# Patient Record
Sex: MALE | Race: WHITE | NOT HISPANIC OR LATINO | Employment: FULL TIME | URBAN - METROPOLITAN AREA
[De-identification: names, ages, dates, MRNs, and addresses within clinical notes are randomized per-mention and may not be internally consistent; named-entity substitution may affect disease eponyms.]

---
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
## 2021-03-04 ENCOUNTER — APPOINTMENT (OUTPATIENT)
Dept: RADIOLOGY | Facility: IMAGING CENTER | Age: 21
End: 2021-03-04
Attending: PHYSICIAN ASSISTANT
Payer: COMMERCIAL

## 2021-03-04 ENCOUNTER — OFFICE VISIT (OUTPATIENT)
Dept: URGENT CARE | Facility: CLINIC | Age: 21
End: 2021-03-04
Payer: COMMERCIAL

## 2021-03-04 VITALS
HEIGHT: 73 IN | TEMPERATURE: 99.1 F | OXYGEN SATURATION: 98 % | SYSTOLIC BLOOD PRESSURE: 118 MMHG | DIASTOLIC BLOOD PRESSURE: 88 MMHG | HEART RATE: 46 BPM | BODY MASS INDEX: 22.53 KG/M2 | WEIGHT: 170 LBS | RESPIRATION RATE: 16 BRPM

## 2021-03-04 DIAGNOSIS — S96.912A MUSCLE STRAIN OF LEFT FOOT, INITIAL ENCOUNTER: ICD-10-CM

## 2021-03-04 PROCEDURE — 99213 OFFICE O/P EST LOW 20 MIN: CPT | Performed by: PHYSICIAN ASSISTANT

## 2021-03-04 PROCEDURE — 73630 X-RAY EXAM OF FOOT: CPT | Mod: TC,FY,LT | Performed by: PHYSICIAN ASSISTANT

## 2021-03-04 ASSESSMENT — ENCOUNTER SYMPTOMS
FEVER: 0
SENSORY CHANGE: 0
NAUSEA: 0
CHILLS: 0
VOMITING: 0
FOCAL WEAKNESS: 0

## 2021-03-04 NOTE — LETTER
March 4, 2021       Patient: Tahir Damon   YOB: 2000   Date of Visit: 3/4/2021         To Whom It May Concern:    In my medical opinion, I recommend that Tahir Damon should be permitted light duty at work for one week while wearing walking boot. He may return to full duty thereafter.   If you have any questions or concerns, please don't hesitate to call [phone]          Sincerely,          Braden Tierney P.A.-C.  Electronically Signed

## 2021-03-04 NOTE — PROGRESS NOTES
"Subjective:   Tahir Damon  is a 20 y.o. male who presents for Foot Injury (x2 days, left foot, painful )      HPI    Patient comes clinic describing injury to left yesterday.  He states he was jumping on a trampoline at a trampoline park and when he came down landing his foot planted half and half on a firm surface of borders of the trampoline.  He states his foot was forced into inversion type mechanism complaining of pain to outer aspect of left foot.  He denies focal areas of tenderness rather the whole lateral edge of left foot.  He denies past medical history of surgery or significant injury to this area.  He does have past medical history of contralateral right foot strain with diagnosis of partial plantar fascial tear in 2018.  That was treated with a walking boot and follow-up with sports medicine.  Patient states this feels similar to that injury.  Has tried an OTC ankle brace with minimal change in symptoms as well as NSAIDs with slight benefit.  Patient went to work today but was to lift boxes and was directed to the doctor for a checkup.    Review of Systems   Constitutional: Negative for chills and fever.   Gastrointestinal: Negative for nausea and vomiting.   Musculoskeletal:        Left foot pain   Skin: Negative for rash.   Neurological: Negative for sensory change and focal weakness.       Allergies   Allergen Reactions   • Other Food Rash     Nuts allergy and has swelling of face and rash         Objective:   /88   Pulse (!) 46   Temp 37.3 °C (99.1 °F) (Temporal)   Resp 16   Ht 1.854 m (6' 1\")   Wt 77.1 kg (170 lb)   SpO2 98%   BMI 22.43 kg/m²     Physical Exam  Vitals and nursing note reviewed.   Constitutional:       General: He is not in acute distress.     Appearance: He is well-developed. He is not diaphoretic.   HENT:      Head: Normocephalic and atraumatic.      Right Ear: External ear normal.      Left Ear: External ear normal.      Nose: Nose normal.   Eyes:      " General: No scleral icterus.        Right eye: No discharge.         Left eye: No discharge.      Conjunctiva/sclera: Conjunctivae normal.   Pulmonary:      Effort: Pulmonary effort is normal. No respiratory distress.   Musculoskeletal:         General: Normal range of motion.      Cervical back: Neck supple.      Right ankle: Normal.      Right Achilles Tendon: Normal.      Left ankle: Normal.      Left Achilles Tendon: Normal.      Right foot: Normal.      Left foot: Normal range of motion and normal capillary refill. Tenderness ( lateral edge) present. No swelling, deformity, bunion, Charcot foot, foot drop, prominent metatarsal heads, laceration, bony tenderness or crepitus. Normal pulse.   Skin:     General: Skin is warm and dry.      Coloration: Skin is not pale.   Neurological:      Mental Status: He is alert and oriented to person, place, and time.      Coordination: Coordination normal.     dx foot -      3/4/2021 9:53 AM     HISTORY/REASON FOR EXAM:  Pain/Deformity Following Trauma        TECHNIQUE/EXAM DESCRIPTION AND NUMBER OF VIEWS:  3 nonweightbearing views of the LEFT foot.     COMPARISON:  None     FINDINGS:  There is no evidence of fracture or dislocation. Alignment is maintained.  No periosteal new bone formation or osseous erosion is identified.        IMPRESSION:     No evidence of acute fracture or dislocation.             Last Resulted: 03/04/21 10:03 AM        Patient sent with walking boot.    Assessment/Plan:   1. Muscle strain of left foot, initial encounter  - DX-FOOT-COMPLETE 3+ LEFT; Future  - REFERRAL TO SPORTS MEDICINE  Recommend conservative care, rest, ice, elevation, work on gentle ROM exercises, discussed with patient recommendation to rest, elevate, ice, be diligent with OTC NSAIDs and follow-up with sports medicine if symptoms are persistent  Return to clinic with lack of resolution or progression of symptoms.  F/u w/ sports med w/ persistence    I have worn an N95 mask, gloves  and eye protection for the entire encounter with this patient.     Differential diagnosis, natural history, supportive care, and indications for immediate follow-up discussed.

## 2021-04-07 ENCOUNTER — NON-PROVIDER VISIT (OUTPATIENT)
Dept: OCCUPATIONAL MEDICINE | Facility: CLINIC | Age: 21
End: 2021-04-07

## 2021-04-07 DIAGNOSIS — Z02.89 VISIT FOR OCCUPATIONAL HEALTH EXAMINATION: ICD-10-CM

## 2021-04-07 PROCEDURE — 8907 PR URINE COLLECT ONLY: Performed by: PREVENTIVE MEDICINE

## 2021-04-15 ENCOUNTER — NON-PROVIDER VISIT (OUTPATIENT)
Dept: OCCUPATIONAL MEDICINE | Facility: CLINIC | Age: 21
End: 2021-04-15

## 2021-04-15 DIAGNOSIS — Z02.1 PRE-EMPLOYMENT HEALTH SCREENING EXAMINATION: ICD-10-CM

## 2021-04-15 PROCEDURE — 8907 PR URINE COLLECT ONLY: Performed by: PREVENTIVE MEDICINE

## 2021-04-15 NOTE — NON-PROVIDER
Tierra started the drug test. He does not want to stay since he didn't provide enough. He called his supervisor to see if you can come back...

## 2022-08-01 ENCOUNTER — APPOINTMENT (OUTPATIENT)
Dept: RADIOLOGY | Facility: IMAGING CENTER | Age: 22
End: 2022-08-01
Attending: PHYSICIAN ASSISTANT
Payer: COMMERCIAL

## 2022-08-01 ENCOUNTER — OFFICE VISIT (OUTPATIENT)
Dept: URGENT CARE | Facility: CLINIC | Age: 22
End: 2022-08-01
Payer: COMMERCIAL

## 2022-08-01 VITALS
WEIGHT: 165 LBS | DIASTOLIC BLOOD PRESSURE: 84 MMHG | RESPIRATION RATE: 18 BRPM | TEMPERATURE: 98.2 F | OXYGEN SATURATION: 98 % | HEART RATE: 64 BPM | HEIGHT: 74 IN | SYSTOLIC BLOOD PRESSURE: 132 MMHG | BODY MASS INDEX: 21.17 KG/M2

## 2022-08-01 DIAGNOSIS — S60.051A CONTUSION OF RIGHT LITTLE FINGER WITHOUT DAMAGE TO NAIL, INITIAL ENCOUNTER: ICD-10-CM

## 2022-08-01 DIAGNOSIS — M79.641 RIGHT HAND PAIN: ICD-10-CM

## 2022-08-01 PROCEDURE — 99213 OFFICE O/P EST LOW 20 MIN: CPT | Performed by: PHYSICIAN ASSISTANT

## 2022-08-01 PROCEDURE — 73130 X-RAY EXAM OF HAND: CPT | Mod: TC,FY,RT | Performed by: RADIOLOGY

## 2022-08-01 NOTE — PROGRESS NOTES
"  Subjective:   Tahir Damon is a 21 y.o. male who presents today with   Chief Complaint   Patient presents with   • Hand Injury     Right hand, Punch a wall,      Hand Injury  This is a new problem. The current episode started today. The problem occurs constantly. The problem has been unchanged. The symptoms are aggravated by bending. He has tried nothing for the symptoms. The treatment provided no relief.   Patient states it did happen while he was at work today but had nothing to do with work and does not want to file it as work-related injury today.  He states it was emotional control as he got frustrated and punched a wall.    PMH:  has no past medical history of Asthma or Type II or unspecified type diabetes mellitus without mention of complication, not stated as uncontrolled.  MEDS: No current outpatient medications on file.  ALLERGIES:   Allergies   Allergen Reactions   • Other Food Rash     Nuts allergy and has swelling of face and rash      SURGHX: History reviewed. No pertinent surgical history.  SOCHX:  reports that he has never smoked. He has never used smokeless tobacco. He reports current alcohol use. He reports that he does not use drugs.  FH: Reviewed with patient, not pertinent to this visit.     Review of Systems   Musculoskeletal:        Right hand pain      Objective:   /84   Pulse 64   Temp 36.8 °C (98.2 °F) (Temporal)   Resp 18   Ht 1.88 m (6' 2\")   Wt 74.8 kg (165 lb)   SpO2 98%   BMI 21.18 kg/m²   Physical Exam  Vitals and nursing note reviewed.   Constitutional:       General: He is not in acute distress.     Appearance: Normal appearance. He is well-developed. He is not ill-appearing or toxic-appearing.   HENT:      Head: Normocephalic and atraumatic.      Right Ear: Hearing normal.      Left Ear: Hearing normal.   Eyes:      Pupils: Pupils are equal, round, and reactive to light.   Cardiovascular:      Rate and Rhythm: Normal rate and regular rhythm.      Heart sounds: " Normal heart sounds.   Pulmonary:      Effort: Pulmonary effort is normal.   Musculoskeletal:        Hands:       Comments: Tenderness to palpation of the fourth and fifth metacarpal of the right hand.  Swelling to the dorsal aspect of the right hand as well in this area.  Neurovascular intact distally.  Less than 2 capillary fill.  Patient is able to flex and extend the digits of the right hand.  Does have pain with making a full fist.   Skin:     General: Skin is warm and dry.   Neurological:      Mental Status: He is alert.      Coordination: Coordination normal.   Psychiatric:         Mood and Affect: Mood normal.       DX HAND   FINDINGS:  Mild dorsal soft tissue swelling of the distal metacarpals.  No radiopaque foreign body.  No fracture or dislocation.     IMPRESSION:     1.  No fracture or dislocation of RIGHT hand.  2.  Mild dorsal soft tissue swelling.    Assessment/Plan:   Assessment    1. Right hand pain  - DX-HAND 3+ RIGHT; Future    2. Contusion of right little finger without damage to nail, initial encounter  No fracture on x-ray today.  Most consistent with contusion of the right hand.  Splint and Ace wrap given today.  Ice and rest recommended.  Discussed recommendation of repeating x-ray if symptoms continue to persist over the next 7 days.  Again patient elects to not file injury as work-related injury.  Differential diagnosis, natural history, supportive care, and indications for immediate follow-up discussed.   Patient given instructions and understanding of medications and treatment.    If not improving in 3-5 days, F/U with PCP or return to  if symptoms worsen.    Patient agreeable to plan.      Please note that this dictation was created using voice recognition software. I have made every reasonable attempt to correct obvious errors, but I expect that there are errors of grammar and possibly content that I did not discover before finalizing the note.    Manav Jimenez PA-C

## 2023-10-13 ENCOUNTER — NON-PROVIDER VISIT (OUTPATIENT)
Dept: OCCUPATIONAL MEDICINE | Facility: CLINIC | Age: 23
End: 2023-10-13

## 2023-10-13 DIAGNOSIS — Z02.89 VISIT FOR OCCUPATIONAL HEALTH EXAMINATION: ICD-10-CM

## 2023-10-13 PROCEDURE — 8907 PR URINE COLLECT ONLY: Performed by: NURSE PRACTITIONER

## 2024-05-10 ENCOUNTER — APPOINTMENT (OUTPATIENT)
Dept: RADIOLOGY | Facility: IMAGING CENTER | Age: 24
End: 2024-05-10
Attending: NURSE PRACTITIONER
Payer: COMMERCIAL

## 2024-05-10 ENCOUNTER — OCCUPATIONAL MEDICINE (OUTPATIENT)
Dept: URGENT CARE | Facility: CLINIC | Age: 24
End: 2024-05-10
Payer: COMMERCIAL

## 2024-05-10 VITALS
SYSTOLIC BLOOD PRESSURE: 102 MMHG | HEART RATE: 69 BPM | RESPIRATION RATE: 12 BRPM | OXYGEN SATURATION: 99 % | BODY MASS INDEX: 21.46 KG/M2 | HEIGHT: 74 IN | WEIGHT: 167.2 LBS | DIASTOLIC BLOOD PRESSURE: 40 MMHG | TEMPERATURE: 97.8 F

## 2024-05-10 DIAGNOSIS — S69.92XA INJURY OF LEFT LITTLE FINGER, INITIAL ENCOUNTER: ICD-10-CM

## 2024-05-10 DIAGNOSIS — S63.259A DISLOCATION OF FINGER, INITIAL ENCOUNTER: ICD-10-CM

## 2024-05-10 PROCEDURE — 99213 OFFICE O/P EST LOW 20 MIN: CPT | Performed by: NURSE PRACTITIONER

## 2024-05-10 PROCEDURE — 73140 X-RAY EXAM OF FINGER(S): CPT | Mod: TC,LT | Performed by: RADIOLOGY

## 2024-05-10 PROCEDURE — 3078F DIAST BP <80 MM HG: CPT | Performed by: NURSE PRACTITIONER

## 2024-05-10 PROCEDURE — 3074F SYST BP LT 130 MM HG: CPT | Performed by: NURSE PRACTITIONER

## 2024-05-10 ASSESSMENT — ENCOUNTER SYMPTOMS: TINGLING: 1

## 2024-05-11 NOTE — PROGRESS NOTES
"Subjective:   Tahir Damon is a 23 y.o. male who presents for Other (NEW WC DOI: 5/10/24 (L) pinky feels numb, painful )      HPI  DOI: 5/10/24: Patient is a 27-year-old male presents urgent care for evaluation after he was injured at work.  Patient was unloading a trailer when a large box weighing approximately 85 pounds fell injuring his left little finger.  Left little finger was dislocated laterally.  He patient unintentionally reduced dislocation when he was using an ice pack.  After putting finger back in place he did zacarias tape his fingers.  Has pain at the joint.  Does have some numbness feeling decreased range of motion.  Patient is right-hand dominant.  Patient denies previous injury to the left hand.  Patient denies second job  Review of Systems   Musculoskeletal:  Positive for joint pain.   Neurological:  Positive for tingling.       Medications:    IBUPROFEN PO    Allergies: Other food    Problem List: Tahir Damon does not have a problem list on file.    Surgical History:  No past surgical history on file.    Past Social Hx: Tahir Damon  reports that he has never smoked. He has never used smokeless tobacco. He reports current alcohol use. He reports current drug use. Drug: Marijuana.     Past Family Hx:  Tahir Damon family history includes Diabetes in his paternal grandmother.     Problem list, medications, and allergies reviewed by myself today in Epic.     Objective:     /40 (BP Location: Right arm, Patient Position: Sitting, BP Cuff Size: Adult)   Pulse 69   Temp 36.6 °C (97.8 °F) (Temporal)   Resp 12   Ht 1.88 m (6' 2\")   Wt 75.8 kg (167 lb 3.2 oz)   SpO2 99%   BMI 21.47 kg/m²     Physical Exam  Constitutional:       Appearance: Normal appearance. He is not ill-appearing or toxic-appearing.   HENT:      Head: Normocephalic.      Right Ear: External ear normal.      Left Ear: External ear normal.      Nose: Nose normal.      Mouth/Throat:      Lips: Pink.   Eyes: "      General: Lids are normal.   Pulmonary:      Effort: Pulmonary effort is normal. No accessory muscle usage.   Musculoskeletal:      Left hand: Swelling, tenderness and bony tenderness (Fifth PIP) present. Decreased range of motion. Normal strength. There is disruption of two-point discrimination. Normal capillary refill. Normal pulse.      Cervical back: Full passive range of motion without pain.   Neurological:      Mental Status: He is alert and oriented to person, place, and time.   Psychiatric:         Mood and Affect: Mood normal.         Thought Content: Thought content normal.         Assessment/Plan:     Diagnosis and associated orders:     1. Injury of left little finger, initial encounter  DX-FINGER(S) 2+ LEFT      2. Dislocation of finger, initial encounter             Comments/MDM:     I independently reviewed the patient's imaging and agree with the interpretation of the radiologist.    No fracture or dislocation of LEFT 5th digit.     I personally reviewed prior external notes and prior test results pertinent to today's visit.  X-ray negative for any acute findings.  Patient was able to reduce dislocation prior to arrival.  Finger splint was provided.  Encouraged rest, ice, Tyle Motrin as needed for pain.  Work restrictions provided.  Follow-up in 1 week  Discussed management options, risks and benefits, and alternatives to treatment plan agreed upon.   Red flags discussed and indications to immediately call 911 or present to the Emergency Department.   Supportive care, differential diagnoses, and indications for immediate follow-up discussed with patient.    Patient expresses understanding and agrees to plan. Patient denies any other questions or concerns.              Please note that this dictation was created using voice recognition software. I have made a reasonable attempt to correct obvious errors, but I expect that there are errors of grammar and possibly content that I did not discover  before finalizing the note.    This note was electronically signed by Mauro MARTINEZ.

## 2024-05-17 ENCOUNTER — OCCUPATIONAL MEDICINE (OUTPATIENT)
Dept: URGENT CARE | Facility: CLINIC | Age: 24
End: 2024-05-17
Payer: COMMERCIAL

## 2024-05-17 VITALS
DIASTOLIC BLOOD PRESSURE: 62 MMHG | WEIGHT: 167 LBS | TEMPERATURE: 98.2 F | RESPIRATION RATE: 15 BRPM | BODY MASS INDEX: 21.43 KG/M2 | SYSTOLIC BLOOD PRESSURE: 118 MMHG | HEIGHT: 74 IN | OXYGEN SATURATION: 95 % | HEART RATE: 72 BPM

## 2024-05-17 DIAGNOSIS — S63.259D DISLOCATION OF FINGER, SUBSEQUENT ENCOUNTER: ICD-10-CM

## 2024-05-17 PROCEDURE — 3078F DIAST BP <80 MM HG: CPT | Performed by: STUDENT IN AN ORGANIZED HEALTH CARE EDUCATION/TRAINING PROGRAM

## 2024-05-17 PROCEDURE — 99214 OFFICE O/P EST MOD 30 MIN: CPT | Performed by: STUDENT IN AN ORGANIZED HEALTH CARE EDUCATION/TRAINING PROGRAM

## 2024-05-17 PROCEDURE — 3074F SYST BP LT 130 MM HG: CPT | Performed by: STUDENT IN AN ORGANIZED HEALTH CARE EDUCATION/TRAINING PROGRAM

## 2024-05-17 NOTE — PROGRESS NOTES
"Subjective:   Tahir Damon is a 23 y.o. male who presents for Follow-Up (Workers [employer], [employer] [facility] )      HPI:  23-year-old male presents today for work-related injury follow-up.  Still having swelling to the left pinky finger.  Does still have reduced range of motion.  Wearing a splint and tolerating his current work.  Has not had significant improvement since last visit.  No worsening symptoms though.  No numbness, tingling, or burning.  Negative x-rays at last visit.      Medications:    IBUPROFEN PO    Allergies: Other food    Problem List: Tahir Damon does not have a problem list on file.    Surgical History:  No past surgical history on file.    Past Social Hx: Tahir Damon  reports that he has never smoked. He has never used smokeless tobacco. He reports current alcohol use. He reports current drug use. Drug: Marijuana.     Past Family Hx:  Tahir Damon family history includes Diabetes in his paternal grandmother.     Problem list, medications, and allergies reviewed by myself today in Epic.     Objective:     /62   Pulse 72   Temp 36.8 °C (98.2 °F) (Temporal)   Resp 15   Ht 1.88 m (6' 2\")   Wt 75.8 kg (167 lb)   SpO2 95%   BMI 21.44 kg/m²     Physical Exam  Vitals reviewed.   Cardiovascular:      Rate and Rhythm: Normal rate.      Pulses: Normal pulses.   Pulmonary:      Effort: Pulmonary effort is normal.   Musculoskeletal:      Comments: Left fifth finger: Swelling to the PIP joint.  Tenderness to the PIP joint with palpation.  Reduced range of motion with making a full fist although only slightly limited.  Does have range of motion all joints of the finger.  No numbness, tingling, burning.  Cap refill less than 2 seconds.  2+ radial pulse.         Assessment/Plan:     Diagnosis and associated orders:     1. Dislocation of finger, subsequent encounter           Comments/MDM:     Patient's presentation physical exam findings consistent with soft tissue injury versus " ligamentous injury.  Does have some reduced range of motion which could be due to swelling versus ligament pathology.  Stable this time and tolerating current work restrictions.  Continue work restrictions.  Referral to occupational medicine for further evaluation.         Differential diagnosis, natural history, supportive care, and indications for immediate follow-up discussed.    Advised the patient to follow-up with the primary care physician for recheck, reevaluation, and consideration of further management.    Please note that this dictation was created using voice recognition software. I have made a reasonable attempt to correct obvious errors, but I expect that there are errors of grammar and possibly content that I did not discover before finalizing the note.    Electronically signed by Paul Payne PA-C.       Sski Pregnancy And Lactation Text: This medication is Pregnancy Category D and isn't considered safe during pregnancy. It is excreted in breast milk.

## 2024-06-04 ENCOUNTER — OCCUPATIONAL MEDICINE (OUTPATIENT)
Dept: OCCUPATIONAL MEDICINE | Facility: CLINIC | Age: 24
End: 2024-06-04
Payer: COMMERCIAL

## 2024-06-04 VITALS
OXYGEN SATURATION: 98 % | TEMPERATURE: 97.5 F | RESPIRATION RATE: 18 BRPM | DIASTOLIC BLOOD PRESSURE: 78 MMHG | HEIGHT: 74 IN | BODY MASS INDEX: 21.82 KG/M2 | HEART RATE: 63 BPM | SYSTOLIC BLOOD PRESSURE: 105 MMHG | WEIGHT: 170 LBS

## 2024-06-04 DIAGNOSIS — S63.259D DISLOCATION OF FINGER, SUBSEQUENT ENCOUNTER: ICD-10-CM

## 2024-06-04 PROCEDURE — 1125F AMNT PAIN NOTED PAIN PRSNT: CPT | Performed by: NURSE PRACTITIONER

## 2024-06-04 PROCEDURE — 3078F DIAST BP <80 MM HG: CPT | Performed by: NURSE PRACTITIONER

## 2024-06-04 PROCEDURE — 99214 OFFICE O/P EST MOD 30 MIN: CPT | Performed by: NURSE PRACTITIONER

## 2024-06-04 PROCEDURE — 3074F SYST BP LT 130 MM HG: CPT | Performed by: NURSE PRACTITIONER

## 2024-06-04 ASSESSMENT — PAIN SCALES - GENERAL: PAINLEVEL: 2=MINIMAL-SLIGHT

## 2024-06-04 NOTE — PROGRESS NOTES
Subjective:     Tahir Damon is a 23 y.o. male who presents for Follow-Up ( Follow-Up (Work[employer], [employer]) RM 18/ /)  (Copied from initial visit) DOI: 5/10/24: Patient is a 27-year-old male presents urgent care for evaluation after he was injured at work.  Patient was unloading a trailer when a large box weighing approximately 85 pounds fell injuring his left little finger.  Left little finger was dislocated laterally.  He patient unintentionally reduced dislocation when he was using an ice pack.  After putting finger back in place he did zacarias tape his fingers.  Has pain at the joint.  Does have some numbness feeling decreased range of motion.  Patient is right-hand dominant.  Patient denies previous injury to the left hand.  Patient denies second job     Today patient states that symptoms have improved.  He does have some tightness when he goes to bend his finger and some mild swelling otherwise no new or worsening symptoms.  He states that he does not have any numbness, tingling, or weakness in the joint.  He is not taking any OTC medication or using ice.  He is doing range of motion to improve things.  Anticipate continued resolution of symptoms over time.  Patient declined hand therapy at this time.  He would like to be released back to full duty.  Plan of care discussed with patient.         ROS: All systems were reviewed on intake form, form was reviewed and signed. See scanned documents in media. Pertinent positives and negatives included in HPI.    PMH: No pertinent past medical history to this problem  MEDS: Medications were reviewed in Epic  ALLERGIES:   Allergies   Allergen Reactions    Other Food Rash     Nuts allergy and has swelling of face and rash      SOCHX: Works as  at [employer]  FH: No pertinent family history to this problem       Objective:     /78 (BP Location: Left arm, Patient Position: Sitting, BP Cuff Size: Adult)   Pulse 63   Temp 36.4 °C (97.5  "°F)   Resp 18   Ht 1.88 m (6' 2\")   Wt 77.1 kg (170 lb)   SpO2 98%   BMI 21.83 kg/m²     [unfilled]     Left pinky: No gross deformity or discoloration noted.  There is mild soft tissue swelling noted to the fifth PIP.  Strength is intact.  Neurovascular sensation intact.  Minimally decreased range of motion.   strength 5/5.    Assessment/Plan:       1. Dislocation of finger, subsequent encounter      FROM   6/4/24 TO  Discharged/I           Differential diagnosis, natural history, supportive care, and indications for immediate follow-up discussed.    .Work Status: Full Duty - see D-[employer] or other state/federal worker's compensation forms for specific restrictions if applicable  Follow-up if needed    Differential diagnosis, natural history, supportive care, and indications for immediate follow-up discussed.    Approximately 45 minutes were spent in reviewing notes, preparing for visit, obtaining history, exam and evaluation, patient counseling/education, and post visit documentation/orders. Significant time was spent completing state/federal worker's compensation forms.      "

## 2025-04-16 ENCOUNTER — RESULTS FOLLOW-UP (OUTPATIENT)
Dept: URGENT CARE | Facility: CLINIC | Age: 25
End: 2025-04-16

## 2025-04-16 ENCOUNTER — OFFICE VISIT (OUTPATIENT)
Dept: URGENT CARE | Facility: CLINIC | Age: 25
End: 2025-04-16
Payer: COMMERCIAL

## 2025-04-16 VITALS
OXYGEN SATURATION: 97 % | SYSTOLIC BLOOD PRESSURE: 116 MMHG | RESPIRATION RATE: 18 BRPM | WEIGHT: 166.4 LBS | HEART RATE: 88 BPM | HEIGHT: 73 IN | TEMPERATURE: 100.2 F | BODY MASS INDEX: 22.05 KG/M2 | DIASTOLIC BLOOD PRESSURE: 76 MMHG

## 2025-04-16 DIAGNOSIS — J02.9 PHARYNGITIS, UNSPECIFIED ETIOLOGY: ICD-10-CM

## 2025-04-16 DIAGNOSIS — J06.9 VIRAL URI: ICD-10-CM

## 2025-04-16 LAB — S PYO DNA SPEC NAA+PROBE: NOT DETECTED

## 2025-04-16 PROCEDURE — 99213 OFFICE O/P EST LOW 20 MIN: CPT

## 2025-04-16 PROCEDURE — 3074F SYST BP LT 130 MM HG: CPT

## 2025-04-16 PROCEDURE — 3078F DIAST BP <80 MM HG: CPT

## 2025-04-16 PROCEDURE — 87651 STREP A DNA AMP PROBE: CPT

## 2025-04-16 ASSESSMENT — ENCOUNTER SYMPTOMS
CHILLS: 1
MYALGIAS: 1
HEADACHES: 1
COUGH: 1
SORE THROAT: 1
FEVER: 1

## 2025-04-16 NOTE — PROGRESS NOTES
Subjective:     CHIEF COMPLAINT  Chief Complaint   Patient presents with    Congestion     X 4 days, congestion, hot flashes, cough. Requesting a work note.       HPI  Tahir Damon is a very pleasant 24 y.o. male who presents with a fever, a cough, hot and cold flashes, persistent sweats, lack of appetite, nasal congestion, a mild sore throat, and fatigue.  His symptoms have been present for the past 4 days.  A coworker was sick with similar symptoms.  He has tried managing his symptoms with nasal spray, use of a humidifier, Epsom salt baths, and NyQuil.  He is requesting a work note to excuse his absences from work Monday through Wednesday of this week.      REVIEW OF SYSTEMS  Review of Systems   Constitutional:  Positive for chills, fever and malaise/fatigue.   HENT:  Positive for congestion and sore throat (Mild).    Respiratory:  Positive for cough.    Musculoskeletal:  Positive for myalgias.   Neurological:  Positive for headaches.       PAST MEDICAL HISTORY  There are no active problems to display for this patient.      SURGICAL HISTORY  patient denies any surgical history    ALLERGIES  Allergies   Allergen Reactions    Other Food Rash     Nuts allergy and has swelling of face and rash        CURRENT MEDICATIONS  Home Medications       Reviewed by Priya Jones P.A.-C. (Physician Assistant) on 04/16/25 at 1142  Med List Status: <None>     Medication Last Dose Status   IBUPROFEN PO Not Taking Active                    SOCIAL HISTORY  Social History     Tobacco Use    Smoking status: Never    Smokeless tobacco: Never   Vaping Use    Vaping status: Some Days    Substances: Nicotine, THC, CBD, Flavoring    Devices: Disposable, Pre-filled or refillable cartridge   Substance and Sexual Activity    Alcohol use: Not Currently     Comment: occ    Drug use: Not Currently     Types: Marijuana    Sexual activity: Not Currently       FAMILY HISTORY  Family History   Problem Relation Age of Onset    Diabetes  "Paternal Grandmother           Objective:     VITAL SIGNS: /76   Pulse 88   Temp 37.9 °C (100.2 °F) (Oral)   Resp 18   Ht 1.854 m (6' 1\")   Wt 75.5 kg (166 lb 6.4 oz)   SpO2 97%   BMI 21.95 kg/m²     PHYSICAL EXAM  Physical Exam  Vitals reviewed.   Constitutional:       General: He is not in acute distress.     Appearance: Normal appearance. He is ill-appearing. He is not toxic-appearing.   HENT:      Head: Normocephalic and atraumatic.      Right Ear: Tympanic membrane, ear canal and external ear normal.      Left Ear: Tympanic membrane, ear canal and external ear normal.      Nose: Congestion present.      Mouth/Throat:      Mouth: Mucous membranes are moist.      Pharynx: Uvula midline. Pharyngeal swelling, posterior oropharyngeal erythema and postnasal drip present. No oropharyngeal exudate.   Eyes:      Conjunctiva/sclera: Conjunctivae normal.      Pupils: Pupils are equal, round, and reactive to light.   Cardiovascular:      Rate and Rhythm: Normal rate and regular rhythm.      Heart sounds: Normal heart sounds.   Pulmonary:      Effort: Pulmonary effort is normal. No respiratory distress.      Breath sounds: Normal breath sounds. No stridor. No wheezing, rhonchi or rales.   Skin:     General: Skin is warm and dry.      Coloration: Skin is not pale.   Neurological:      General: No focal deficit present.      Mental Status: He is alert and oriented to person, place, and time.   Psychiatric:         Mood and Affect: Mood normal.         Assessment/Plan:     1. Pharyngitis, unspecified etiology  - POCT CEPHEID GROUP A STREP - PCR    2. Viral URI  -Warm salt water gargles as needed for sore throat  -Tylenol/ibuprofen OTC as needed for pain  -Return to clinic if symptoms worsen or fail to resolve      MDM/Comments:  Patient has stable vital signs and is non-toxic appearing.  Strep testing performed in office with negative results.  Patient has been informed of results via [facility].  Patient has " politely declined viral testing for COVID, influenza, and RSV.  His lungs are clear to auscultation bilaterally at this time with a pulse oxygen of 97% on room air.  Discussed supportive care with hydration, rest, Tylenol/Ibuprofen as needed. Patient demonstrated understanding of treatment plan at this time and will RTC if symptoms worsen or fail to resolve.     Differential diagnosis, natural history, supportive care, and indications for immediate follow-up discussed. All questions answered. Patient agrees with the plan of care.    Follow-up as needed if symptoms worsen or fail to improve to PCP, Urgent care or Emergency Room.    I have personally reviewed prior external notes and test results pertinent to today's visit.  I have independently reviewed and interpreted all diagnostics ordered during this urgent care acute visit.   Discussed management options (risks,benefits, and alternatives to treatment). Pt expresses understanding and the treatment plan was agreed upon. Questions were encouraged and answered to pt's satisfaction.    Please note that this dictation was created using voice recognition software. I have made a reasonable attempt to correct obvious errors, but I expect that there are errors of grammar and possibly content that I did not discover before finalizing the note.

## 2025-04-16 NOTE — LETTER
April 16, 2025    To Whom It May Concern:         This is confirmation that Tahir Damon attended his appointment with Priya Jones P.A.-C. on 4/16/25. Please excuse his absence from work Monday through today. He may return in 1-3 days if feeling well.          If you have any questions please do not hesitate to call me at the phone number listed below.    Sincerely,          Priya Jones P.A.-C.  [phone]